# Patient Record
(demographics unavailable — no encounter records)

---

## 2024-10-09 NOTE — ASSESSMENT
[FreeTextEntry1] : SHARYN SERRANO underwent an incarcerated umbilical hernia repair with mesh with Dr. Herring on 10/2/24  under local IV sedation without any problems or complications. His wound is clean, dry and intact. There is no evidence of erythema, seroma formation or infection. He is tolerating a diet and having normal bowel movements. He denies any significant postoperative pain or discomfort at this time.   He was counseled and reassured. SHARYN was discharged from the office with no specific follow up necessary, but he knows to avoid any heavy lifting or strenuous activity for the next several weeks. He  was encouraged to continue to wear his abdominal binder for the better part of the next month.

## 2024-10-09 NOTE — PHYSICAL EXAM
[JVD] : no jugular venous distention  [Respiratory Effort] : normal respiratory effort [Alert] : alert [Calm] : calm [de-identified] : overweight [de-identified] : normal [de-identified] : Moderately protuberant abdomen; mild to moderate rectus diastasis.   [de-identified] : Incision clean, dry, intact, no edema, no erythema, no drainage

## 2024-10-09 NOTE — CONSULT LETTER
[Dear  ___] : Dear  [unfilled], [Courtesy Letter:] : I had the pleasure of seeing your patient, [unfilled], in my office today. [Please see my note below.] : Please see my note below. [Consult Closing:] : Thank you very much for allowing me to participate in the care of this patient.  If you have any questions, please do not hesitate to contact me. [Sincerely,] : Sincerely, [FreeTextEntry3] :     Meche Stephenson PA-C, MSPAS

## 2024-10-17 NOTE — ASSESSMENT
[FreeTextEntry1] : Mr. Sabillon is a 57-year-old man with history of HTN, DM2, NIRMAL on CPAP, and obesity presenting for evaluation of chest discomfort.  Impression: (1) Atypical chest pain (2) DM2, diet controlled, A1c 6.6% (3) Hypertension, well controlled on nifedipine 60mg and telmisartan-HCTZ 80-25mg (4) NIRMAL, on CPAP (5) Dyslipidemia with moderate hypertriglyceridemia and moderately elevated LDL (6) Fam hx of AAA rupture  Plan: - CCTA - TTE - AAA US - Continue current meds. He is due for repeat labs later this year. Can decide re: lipid lowering strategy based on CCTA and lab results.  RTC after the above testing

## 2024-10-17 NOTE — HISTORY OF PRESENT ILLNESS
[FreeTextEntry1] : Mr. Sabillon is a 57-year-old man with history of HTN, DM2, NIRMAL on CPAP, and obesity presenting for evaluation of chest discomfort.  Medical history is notable for hypertension diagnosed >20 years ago and diabetes diagnosed within the past year. With lifestyle modification his A1c improved from 10.3 to 6.6%. He also has significant dyslipidemia with hypertriglyceridemia and moderately elevated LDL.  His father had an AAA rupture in his 60s and his grandfather also was known to have an aortic aneurysm.  He endorses occasional non-exertional chest discomfort without overt dyspnea.  ECG shows NSR, normal axis, normal intervals, no ischemic changes.  He follows with Dr. Fleming for management of NIRMAL.  Labs: - Cr 1.1, K 3.6 - A1c 10.3 -> 6.6% - 4/2024: , , HDL 39,  - 8/2024: , , HDL 38,   Meds: - Telmisartan-HCTZ 80-25mg - Nifedipine 60mg - Rosuvastatin 10mg - Cimetidine

## 2024-11-22 NOTE — ASSESSMENT
[FreeTextEntry1] : Mr. Sabillon is a 57-year-old man with history of HTN, DM2, NIRMAL on CPAP, and obesity presenting for evaluation of chest discomfort.  Impression: (1) CAD by CCTA 2024: OM3 severe ostial stenosis with otherwise non-obstructive CAD (2) DM2, diet controlled, A1c 6.6% (3) Hypertension, well controlled on nifedipine 60mg and telmisartan-HCTZ 80-25mg (4) NIRMAL, on CPAP (5) Dyslipidemia with moderate hypertriglyceridemia and moderately elevated LDL,  on rosuvastatin 10mg (6) Fam hx of AAA rupture  Plan: - Diagnosis of CAD was discussed in detail with the patient and his wife. Discussed the etiology, natural course, and treatment options. Discussed signs/symptoms of heart attack with instructions to go to the nearest ED and to call my office afterward. - Continue aspirin - Continue rosuvastatin 20mg (recently increased), if LDL remains >70 will discuss Ezetimibe vs Repatha. - Continue nifedipine and add metoprolol as a second antianginal. - If symptoms persist on 2 antianginals will refer for LHC. - Agree with GLP1a use, was started on Zepbound by his PCP  RTC 1 month

## 2024-11-22 NOTE — HISTORY OF PRESENT ILLNESS
[FreeTextEntry1] : Mr. Sabillon is a 57-year-old man with history of HTN, DM2, NIRMAL on CPAP, and obesity presenting for follow up.  Medical history is notable for hypertension diagnosed >20 years ago and diabetes diagnosed within the past year. With lifestyle modification his A1c improved from 10.3 to 6.6%. He also has significant dyslipidemia with hypertriglyceridemia and moderately elevated LDL.  His father had an AAA rupture in his 60s and his grandfather also was known to have an aortic aneurysm.  On our first visit he endorsed occasional non-exertional chest discomfort without overt dyspnea. Occurring over the past year but worse over the past few months without acute changes.  ECG shows NSR, normal axis, normal intervals, no ischemic changes.  He follows with Dr. Fleming for management of NIRMAL.  CCTA 11/2024: , OM3 severe ostial stenosis, remaining non-obstructive disease, FFR denied by insurance. TTE 10/2024: EF 57%, sclerotic Ao, no significant valvular disease  Labs: - Cr 1.1, K 3.6 - A1c 10.3 -> 6.6% - 4/2024: , , HDL 39,  - 8/2024: , , HDL 38,   Meds: - ASA 81mg - Telmisartan-HCTZ 80-25mg - Nifedipine 60mg - Rosuvastatin 20mg - Zepbound - Cimetidine

## 2025-01-28 NOTE — HISTORY OF PRESENT ILLNESS
[Tinnitus] : tinnitus [Hearing Loss] : hearing loss [Diabetes] : diabetes [Cardiac Disease] : cardiac disease [Hypertension] : hypertension [Loud Noise Exposure] : history of loud noise exposure [de-identified] : Patient presents today for c/o tinnitus and possible sickness. He has tinnitus that's has been ongoing for 5+ years. Hx of noise exposure due to service. He did have pain in the right ear that is getting better.  His right ear feels as if he has lost hearing. Constant tinnitus in both ears. No further complaints.  [Ear Fullness] : no ear fullness [Anxiety] : no anxiety [Dizziness] : no dizziness [Headache] : no headache [Lightheadedness] : no lightheadedness [Neurologic Symptoms] : no associated neurologic symptoms [Orthostatic Hypotension] : no orthostatic hypotension [Otalgia] : no otalgia [Otorrhea] : no otorrhea [Vertigo] : no vertigo [Visual Changes] : no visual changes [Recurrent Otitis Media] : no recurrent otitis media [Meningitis] : no meningitis [Glomus Tumor] : no glomus tumor [Otitis Media with Effusion] : no otitis media with effusion [Stroke] : no stroke [Acoustic Neuroma] : no acoustic neuroma [Presbycusis] : no presbycusis [Prior Ear Surgery] : no prior ear surgery [Facial Nerve Paralysis] : no facial nerve paralysis [Congenital Ear Malformation] : no congenital ear malformation [Allergic Rhinitis] : no allergic rhinitis [Major Depression] : no major depression [Meniere Disease] : no Meniere disease [Eustachian Tube Dysfunction] : no eustachian tube dysfunction [Otosclerosis] : no otosclerosis [Cholesteatoma] : no cholesteatoma [Multiple Sclerosis] : no multiple sclerosis [Perilymphatic Fistula] : no perilymphatic fistula [Autoimmune Diseases] : no autoimmune diseases [Hypotension] : no hypotension [Ototoxic Med Exposure] : no ototoxic medication exposure [History of TM Perforation] : no history of a tympanic membrane perforation [Hx of Radiation Therapy] : no history of radiation therapy [Birth Prematurity] : no birth prematurity [Smoking] : no smoking [Alcohol] : no consumption of alcohol [Narcotic/Benzodiazepine] : no use of narcotic/benzodiazepine

## 2025-01-28 NOTE — ASSESSMENT
[FreeTextEntry1] : Mild SNHL at highest frequency, bilateral tinnitus.  Tinnitus management disucssed.  Follow up in year for yearly audiogram.    Total time spent on patient encounter including review of patient's medical history, physical examination, interpretation of any indicated labs / imaging and counseling, excluding time spent performing any indicated procedures: 35 minutes.    Rupesh Daly MD, MPH Director of Pediatric Otolaryngology [facility] / [facility]

## 2025-01-28 NOTE — ASSESSMENT
[FreeTextEntry1] : Mild SNHL at highest frequency, bilateral tinnitus.  Tinnitus management disucssed.  Follow up in year for yearly audiogram.    Total time spent on patient encounter including review of patient's medical history, physical examination, interpretation of any indicated labs / imaging and counseling, excluding time spent performing any indicated procedures: 35 minutes.    Rupesh Daly MD, MPH Director of Pediatric Otolaryngology [facility] / [facility]

## 2025-01-28 NOTE — HISTORY OF PRESENT ILLNESS
[Tinnitus] : tinnitus [Hearing Loss] : hearing loss [Diabetes] : diabetes [Cardiac Disease] : cardiac disease [Hypertension] : hypertension [Loud Noise Exposure] : history of loud noise exposure [de-identified] : Patient presents today for c/o tinnitus and possible sickness. He has tinnitus that's has been ongoing for 5+ years. Hx of noise exposure due to service. He did have pain in the right ear that is getting better.  His right ear feels as if he has lost hearing. Constant tinnitus in both ears. No further complaints.  [Ear Fullness] : no ear fullness [Anxiety] : no anxiety [Dizziness] : no dizziness [Headache] : no headache [Lightheadedness] : no lightheadedness [Neurologic Symptoms] : no associated neurologic symptoms [Orthostatic Hypotension] : no orthostatic hypotension [Otalgia] : no otalgia [Otorrhea] : no otorrhea [Vertigo] : no vertigo [Visual Changes] : no visual changes [Recurrent Otitis Media] : no recurrent otitis media [Meningitis] : no meningitis [Glomus Tumor] : no glomus tumor [Otitis Media with Effusion] : no otitis media with effusion [Stroke] : no stroke [Acoustic Neuroma] : no acoustic neuroma [Presbycusis] : no presbycusis [Prior Ear Surgery] : no prior ear surgery [Facial Nerve Paralysis] : no facial nerve paralysis [Congenital Ear Malformation] : no congenital ear malformation [Allergic Rhinitis] : no allergic rhinitis [Major Depression] : no major depression [Meniere Disease] : no Meniere disease [Eustachian Tube Dysfunction] : no eustachian tube dysfunction [Otosclerosis] : no otosclerosis [Cholesteatoma] : no cholesteatoma [Multiple Sclerosis] : no multiple sclerosis [Perilymphatic Fistula] : no perilymphatic fistula [Autoimmune Diseases] : no autoimmune diseases [Hypotension] : no hypotension [Ototoxic Med Exposure] : no ototoxic medication exposure [History of TM Perforation] : no history of a tympanic membrane perforation [Hx of Radiation Therapy] : no history of radiation therapy [Birth Prematurity] : no birth prematurity [Smoking] : no smoking [Alcohol] : no consumption of alcohol [Narcotic/Benzodiazepine] : no use of narcotic/benzodiazepine

## 2025-02-03 NOTE — ASSESSMENT
[FreeTextEntry1] : Mr. Sabillon is a 58-year-old man with history of HTN, DM2, NIRMAL on CPAP, and obesity presenting for evaluation of chest discomfort.  Impression: (1) CAD by CCTA 2024: OM3 severe ostial stenosis with otherwise non-obstructive CAD (2) DM2, diet controlled, A1c 6.6% (3) Hypertension, well controlled on nifedipine 60mg and telmisartan-HCTZ 80-25mg (4) NIRMAL, on CPAP (5) Dyslipidemia with moderate hypertriglyceridemia and moderately elevated LDL,  on rosuvastatin 10mg (6) Fam hx of AAA rupture  Plan: - LHC; OM3 appears to be a large vessel on CT imaging. He remains symptomatic on OMT. - Diagnosis of CAD was discussed in detail with the patient and his wife. Discussed the etiology, natural course, and treatment options. Discussed signs/symptoms of heart attack with instructions to go to the nearest ED and to call my office afterward. - Continue aspirin - Continue rosuvastatin 20mg - Continue nifedipine and metoprolol - Agree with GLP1a use, was started on Zepbound by his PCP  RTC after C

## 2025-02-03 NOTE — HISTORY OF PRESENT ILLNESS
[FreeTextEntry1] : Mr. Sabillon is a 58-year-old man with history of HTN, DM2, NIRMAL on CPAP, and obesity presenting for follow up.  Medical history is notable for hypertension diagnosed >20 years ago and diabetes diagnosed within the past year. With lifestyle modification his A1c improved from 10.3 to 6.6%. He also has significant dyslipidemia with hypertriglyceridemia and moderately elevated LDL.  His father had an AAA rupture in his 60s and his grandfather also was known to have an aortic aneurysm.  On our first visit he endorsed occasional non-exertional chest discomfort without overt dyspnea. Occurring over the past year but worse over the past few months without acute changes. Since his diagnosis has noted persistent but mild intermittent chest discomfort/dyspnea with exertion.  ECG shows NSR, normal axis, normal intervals, no ischemic changes.  He follows with Dr. Fleming for management of NIRMAL.  CCTA 11/2024: , OM3 severe ostial stenosis, remaining non-obstructive disease, FFR denied by insurance. TTE 10/2024: EF 57%, sclerotic Ao, no significant valvular disease  Labs: - Cr 1.1, K 3.6 - A1c 10.3 -> 6.6% - 4/2024: , , HDL 39,  - 8/2024: , , HDL 38,  - 11/2024: ,   Meds: - ASA 81mg - Telmisartan-HCTZ 80-25mg - Nifedipine 30mg - Metoprolol succinate 50mg - Rosuvastatin 20mg - Cimetidine

## 2025-03-17 NOTE — ASSESSMENT
[FreeTextEntry1] : Assessment: NIRMAL Obesity EDS controlled   PLAN: The patient is using and benefitting from the PAP device. There is good compliance with the CPAP. New supplies will be ordered today. Weight loss maintenance discussed. I stressed the need maintain compliance with the PAP device. The patient is not to use an Ozone or UV sterilizer. The patient was educated in the absolute requirement to use the PAP device nightly  Assessment and Plan: - Obstructive Sleep Apnea : Patient has a history of obstructive sleep apnea requiring CPAP therapy. Current issues involve air leaks, mask fit, and humidity settings. - Adjust CPAP mask: Recommend tightening the nasal mask slightly to prevent air leaks  - Increase humidity: Suggest increasing humidity settings on the CPAP machine to address dryness issues  - CPAP pillow: Advise patient to try a different CPAP pillow without wing extensions for better comfort  - Monitor for mouth breathing: If dryness persists, consider using a chin strap or switching to a full face mask  - Follow-up: Schedule a follow-up appointment to assess the effectiveness of these changes  - Patient education: Explained the relationship between air leaks, machine pressure adjustments, and water consumption  - Equipment options: Discussed various mask types (nasal, full face, hybrid) and their potential benefits F/U in 4months

## 2025-03-17 NOTE — HISTORY OF PRESENT ILLNESS
[TextBox_4] : - Summary : Patient presents for follow-up regarding CPAP therapy management, discussing issues with air leaks, mask fit, and humidity settings. - Chief Complaint (CC) : Follow-up for CPAP therapy management - History of Present Illness : Preston Sabillon, a male patient (age not specified), presents for a follow-up appointment regarding his CPAP therapy. He reports previous issues with significant water loss in his CPAP machine, indicating potential air leaks. Recently, the water loss has decreased, but he still experiences dryness. The patient is a side sleeper and has been using a nasal mask after finding a full face mask uncomfortable. He mentions trying different humidity settings and experiencing 'rain out' when increasing the humidity level. The patient also reports toggling off the pressure relief feature, which seemed to help. He has been experiencing discomfort with his current CPAP pillow and is considering alternatives.

## 2025-03-17 NOTE — REASON FOR VISIT
[Home] : at home, [unfilled] , at the time of the visit. [Medical Office: ([facility])___] : at the medical office located in  [Telehealth (audio & video)] : This visit was provided via telehealth using real-time 2-way audio visual technology. [Verbal consent obtained from patient] : the patient, [unfilled]

## 2025-06-07 NOTE — HISTORY OF PRESENT ILLNESS
[FreeTextEntry1] : Mr. Sabillon is a 58-year-old man with history of CAD s/p elective PCI of OM2, HTN, DM2, NIRMAL on CPAP, and obesity presenting for follow up.  Medical history is notable for hypertension diagnosed >20 years ago and diabetes diagnosed within the past year. With lifestyle modification his A1c improved from 10.3 to 6.6%. He also has significant dyslipidemia with hypertriglyceridemia and moderately elevated LDL. He was initiated on a GLP1a with excellent effect on glycemic control and weight loss.  On our first visit he endorsed occasional non-exertional chest discomfort without overt dyspnea. Occurring over the past year but worse over the past few months without acute changes. Since his diagnosis has noted persistent but mild intermittent chest discomfort/dyspnea with exertion.  Following his CCTA we discussed PCI in light of him having ongoing anginal complaints on two antianginals. He opted to continue OMT. Last month he began having more significant chest pain, prompting presentation to the ED where he was ruled out for ACS but ultimately underwent LHC with PCI of a severe ostial OM2 lesion.  Since that time reports marked improvement of symptoms.  ECG shows NSR, normal axis, normal intervals, no ischemic changes.  He follows with Dr. Fleming for management of NIRMAL. His father had an AAA rupture in his 60s and his grandfather also was known to have an aortic aneurysm.  CCTA 11/2024: , OM3 severe ostial stenosis, remaining non-obstructive disease, FFR denied by insurance. TTE 10/2024: EF 57%, sclerotic Ao, no significant valvular disease  Labs: - Cr 1.1, K 3.6 - A1c 10.3 -> 6.6% - 4/2024: , , HDL 39,  - 8/2024: , , HDL 38,  - 11/2024: ,   Meds: - ASA 81mg - Plavix 75mg - Telmisartan-HCTZ 80-25mg - Nifedipine 30mg - Metoprolol succinate 50mg - Rosuvastatin 20mg - Cimetidine

## 2025-06-07 NOTE — ASSESSMENT
[FreeTextEntry1] : Mr. Sabillon is a 58-year-old man with history of HTN, DM2, NIRMAL on CPAP, and obesity presenting for evaluation of chest discomfort.  Impression: (1) CAD by CCTA 2024 s/p elective PCI of a severe ostial OM2 lesion 5/2025 (2) DM2, on a GLP1a with good effect (3) Hypertension, well controlled (4) NIRMAL, on CPAP (5) Dyslipidemia (6) Fam hx of AAA rupture  Plan: - Reviewed his hospital course and [facility] results in detail.  - Continue aspirin and plavix for one year. May transition to SAPT thereafter. - Continue rosuvastatin 20mg - Continue nifedipine and metoprolol - Agree with GLP1a use, was started on Zepbound by his PCP  RTC prior to leaving [place] this winter